# Patient Record
Sex: FEMALE | URBAN - METROPOLITAN AREA
[De-identification: names, ages, dates, MRNs, and addresses within clinical notes are randomized per-mention and may not be internally consistent; named-entity substitution may affect disease eponyms.]

---

## 2017-06-22 ENCOUNTER — APPOINTMENT (RX ONLY)
Dept: URBAN - METROPOLITAN AREA CLINIC 121 | Facility: CLINIC | Age: 77
Setting detail: DERMATOLOGY
End: 2017-06-22

## 2017-06-22 DIAGNOSIS — L21.8 OTHER SEBORRHEIC DERMATITIS: ICD-10-CM

## 2017-06-22 PROCEDURE — ? PRESCRIPTION

## 2017-06-22 PROCEDURE — 99213 OFFICE O/P EST LOW 20 MIN: CPT

## 2017-06-22 PROCEDURE — ? COUNSELING

## 2017-06-22 RX ORDER — DESONIDE 0.5 MG/G
CREAM TOPICAL BID
Qty: 1 | Refills: 1 | Status: ERX

## 2017-06-22 ASSESSMENT — LOCATION SIMPLE DESCRIPTION DERM: LOCATION SIMPLE: RIGHT EYEBROW

## 2017-06-22 ASSESSMENT — LOCATION DETAILED DESCRIPTION DERM: LOCATION DETAILED: RIGHT CENTRAL EYEBROW

## 2017-06-22 ASSESSMENT — LOCATION ZONE DERM: LOCATION ZONE: FACE

## 2017-06-22 NOTE — PROCEDURE: MIPS QUALITY
Quality 131: Pain Assessment And Follow-Up: Pain assessment using a standardized tool is documented as negative, no follow-up plan required
Quality 47: Advance Care Plan: Advance Care Planning discussed and documented in the medical record; patient did not wish or was not able to name a surrogate decision maker or provide an advance care plan.
Detail Level: Detailed
Quality 226: Preventive Care And Screening: Tobacco Use: Screening And Cessation Intervention: Patient screened for tobacco and never smoked
Quality 110: Preventive Care And Screening: Influenza Immunization: Influenza Immunization previously received during influenza season
Quality 130: Documentation Of Current Medications In The Medical Record: Current Medications Documented
Quality 111:Pneumonia Vaccination Status For Older Adults: Pneumococcal Vaccination Previously Received

## 2017-06-23 ENCOUNTER — RX ONLY (OUTPATIENT)
Age: 77
Setting detail: RX ONLY
End: 2017-06-23

## 2017-06-23 RX ORDER — FLUTICASONE PROPIONATE 0.5 MG/G
CREAM TOPICAL
Qty: 1 | Refills: 1 | Status: ERX

## 2017-07-27 ENCOUNTER — APPOINTMENT (RX ONLY)
Dept: URBAN - METROPOLITAN AREA CLINIC 116 | Facility: CLINIC | Age: 77
Setting detail: DERMATOLOGY
End: 2017-07-27

## 2017-07-27 DIAGNOSIS — Z02.9 ENCOUNTER FOR ADMINISTRATIVE EXAMINATIONS, UNSPECIFIED: ICD-10-CM

## 2018-05-24 ENCOUNTER — APPOINTMENT (RX ONLY)
Dept: URBAN - METROPOLITAN AREA CLINIC 121 | Facility: CLINIC | Age: 78
Setting detail: DERMATOLOGY
End: 2018-05-24

## 2018-05-24 DIAGNOSIS — Z02.9 ENCOUNTER FOR ADMINISTRATIVE EXAMINATIONS, UNSPECIFIED: ICD-10-CM

## 2018-05-31 ENCOUNTER — APPOINTMENT (RX ONLY)
Dept: URBAN - METROPOLITAN AREA CLINIC 121 | Facility: CLINIC | Age: 78
Setting detail: DERMATOLOGY
End: 2018-05-31

## 2018-05-31 DIAGNOSIS — Z02.9 ENCOUNTER FOR ADMINISTRATIVE EXAMINATIONS, UNSPECIFIED: ICD-10-CM

## 2018-07-05 ENCOUNTER — APPOINTMENT (RX ONLY)
Dept: URBAN - METROPOLITAN AREA CLINIC 121 | Facility: CLINIC | Age: 78
Setting detail: DERMATOLOGY
End: 2018-07-05

## 2018-07-05 DIAGNOSIS — L21.8 OTHER SEBORRHEIC DERMATITIS: ICD-10-CM

## 2018-07-05 DIAGNOSIS — Z85.828 PERSONAL HISTORY OF OTHER MALIGNANT NEOPLASM OF SKIN: ICD-10-CM

## 2018-07-05 DIAGNOSIS — L57.0 ACTINIC KERATOSIS: ICD-10-CM

## 2018-07-05 PROCEDURE — ? COUNSELING

## 2018-07-05 PROCEDURE — 17000 DESTRUCT PREMALG LESION: CPT

## 2018-07-05 PROCEDURE — ? LIQUID NITROGEN

## 2018-07-05 PROCEDURE — 99213 OFFICE O/P EST LOW 20 MIN: CPT | Mod: 25

## 2018-07-05 PROCEDURE — ? PRESCRIPTION

## 2018-07-05 PROCEDURE — 17003 DESTRUCT PREMALG LES 2-14: CPT

## 2018-07-05 RX ORDER — KETOCONAZOLE 20.5 MG/ML
SHAMPOO, SUSPENSION TOPICAL
Qty: 1 | Refills: 2 | Status: ERX

## 2018-07-05 ASSESSMENT — LOCATION DETAILED DESCRIPTION DERM
LOCATION DETAILED: INFERIOR MID FOREHEAD
LOCATION DETAILED: RIGHT SUPERIOR FOREHEAD
LOCATION DETAILED: LEFT INFERIOR FOREHEAD
LOCATION DETAILED: LEFT SUPERIOR FOREHEAD
LOCATION DETAILED: LEFT INFERIOR LATERAL FOREHEAD
LOCATION DETAILED: RIGHT INFERIOR FOREHEAD
LOCATION DETAILED: LEFT MEDIAL FRONTAL SCALP

## 2018-07-05 ASSESSMENT — LOCATION SIMPLE DESCRIPTION DERM
LOCATION SIMPLE: LEFT FOREHEAD
LOCATION SIMPLE: INFERIOR FOREHEAD
LOCATION SIMPLE: RIGHT FOREHEAD
LOCATION SIMPLE: LEFT SCALP

## 2018-07-05 ASSESSMENT — LOCATION ZONE DERM
LOCATION ZONE: FACE
LOCATION ZONE: SCALP

## 2018-08-09 ENCOUNTER — APPOINTMENT (RX ONLY)
Dept: URBAN - METROPOLITAN AREA CLINIC 116 | Facility: CLINIC | Age: 78
Setting detail: DERMATOLOGY
End: 2018-08-09

## 2018-08-09 DIAGNOSIS — Z02.9 ENCOUNTER FOR ADMINISTRATIVE EXAMINATIONS, UNSPECIFIED: ICD-10-CM

## 2018-09-11 ENCOUNTER — APPOINTMENT (RX ONLY)
Dept: URBAN - METROPOLITAN AREA CLINIC 121 | Facility: CLINIC | Age: 78
Setting detail: DERMATOLOGY
End: 2018-09-11

## 2018-09-11 DIAGNOSIS — Z02.9 ENCOUNTER FOR ADMINISTRATIVE EXAMINATIONS, UNSPECIFIED: ICD-10-CM

## 2018-09-27 ENCOUNTER — APPOINTMENT (RX ONLY)
Dept: URBAN - METROPOLITAN AREA CLINIC 121 | Facility: CLINIC | Age: 78
Setting detail: DERMATOLOGY
End: 2018-09-27

## 2018-09-27 DIAGNOSIS — L21.8 OTHER SEBORRHEIC DERMATITIS: ICD-10-CM

## 2018-09-27 PROCEDURE — 99213 OFFICE O/P EST LOW 20 MIN: CPT

## 2018-09-27 PROCEDURE — ? COUNSELING

## 2018-09-27 PROCEDURE — ? PRESCRIPTION

## 2018-09-27 RX ORDER — KETOCONAZOLE 20 MG/G
CREAM TOPICAL
Qty: 1 | Refills: 2 | Status: ERX

## 2018-09-27 ASSESSMENT — LOCATION DETAILED DESCRIPTION DERM
LOCATION DETAILED: RIGHT INFERIOR CENTRAL MALAR CHEEK
LOCATION DETAILED: RIGHT MEDIAL SUPERIOR CHEST

## 2018-09-27 ASSESSMENT — LOCATION SIMPLE DESCRIPTION DERM
LOCATION SIMPLE: CHEST
LOCATION SIMPLE: RIGHT CHEEK

## 2018-09-27 ASSESSMENT — LOCATION ZONE DERM
LOCATION ZONE: TRUNK
LOCATION ZONE: FACE

## 2018-10-09 ENCOUNTER — APPOINTMENT (RX ONLY)
Dept: URBAN - METROPOLITAN AREA CLINIC 116 | Facility: CLINIC | Age: 78
Setting detail: DERMATOLOGY
End: 2018-10-09

## 2018-10-09 DIAGNOSIS — Z02.9 ENCOUNTER FOR ADMINISTRATIVE EXAMINATIONS, UNSPECIFIED: ICD-10-CM

## 2018-11-08 ENCOUNTER — APPOINTMENT (RX ONLY)
Dept: URBAN - METROPOLITAN AREA CLINIC 121 | Facility: CLINIC | Age: 78
Setting detail: DERMATOLOGY
End: 2018-11-08

## 2018-11-08 DIAGNOSIS — L21.8 OTHER SEBORRHEIC DERMATITIS: ICD-10-CM

## 2018-11-08 PROCEDURE — ? COUNSELING

## 2018-11-08 PROCEDURE — ? PRESCRIPTION

## 2018-11-08 PROCEDURE — 99213 OFFICE O/P EST LOW 20 MIN: CPT

## 2018-11-08 RX ORDER — DESONIDE 0.5 MG/G
CREAM TOPICAL
Qty: 1 | Refills: 2 | Status: ERX

## 2018-11-08 RX ORDER — CLOBETASOL PROPIONATE 0.5 MG/ML
SOLUTION TOPICAL
Qty: 1 | Refills: 1 | Status: ERX

## 2018-11-08 ASSESSMENT — LOCATION SIMPLE DESCRIPTION DERM
LOCATION SIMPLE: NOSE
LOCATION SIMPLE: FRONTAL SCALP
LOCATION SIMPLE: RIGHT EAR

## 2018-11-08 ASSESSMENT — LOCATION DETAILED DESCRIPTION DERM
LOCATION DETAILED: MEDIAL FRONTAL SCALP
LOCATION DETAILED: NASAL SUPRATIP
LOCATION DETAILED: RIGHT CRUS OF HELIX

## 2018-11-08 ASSESSMENT — LOCATION ZONE DERM
LOCATION ZONE: NOSE
LOCATION ZONE: SCALP
LOCATION ZONE: EAR

## 2018-12-06 ENCOUNTER — RX ONLY (OUTPATIENT)
Age: 78
Setting detail: RX ONLY
End: 2018-12-06

## 2018-12-06 ENCOUNTER — APPOINTMENT (RX ONLY)
Dept: URBAN - METROPOLITAN AREA CLINIC 121 | Facility: CLINIC | Age: 78
Setting detail: DERMATOLOGY
End: 2018-12-06

## 2018-12-06 RX ORDER — DESONIDE 0.5 MG/G
CREAM TOPICAL
Qty: 1 | Refills: 2

## 2018-12-20 ENCOUNTER — APPOINTMENT (RX ONLY)
Dept: URBAN - METROPOLITAN AREA CLINIC 121 | Facility: CLINIC | Age: 78
Setting detail: DERMATOLOGY
End: 2018-12-20

## 2018-12-20 DIAGNOSIS — Z02.9 ENCOUNTER FOR ADMINISTRATIVE EXAMINATIONS, UNSPECIFIED: ICD-10-CM

## 2019-10-29 ENCOUNTER — APPOINTMENT (RX ONLY)
Dept: URBAN - METROPOLITAN AREA CLINIC 116 | Facility: CLINIC | Age: 79
Setting detail: DERMATOLOGY
End: 2019-10-29

## 2019-10-29 DIAGNOSIS — Z02.9 ENCOUNTER FOR ADMINISTRATIVE EXAMINATIONS, UNSPECIFIED: ICD-10-CM

## 2019-12-17 ENCOUNTER — APPOINTMENT (RX ONLY)
Dept: URBAN - METROPOLITAN AREA CLINIC 121 | Facility: CLINIC | Age: 79
Setting detail: DERMATOLOGY
End: 2019-12-17

## 2019-12-17 DIAGNOSIS — L57.0 ACTINIC KERATOSIS: ICD-10-CM

## 2019-12-17 PROCEDURE — 17000 DESTRUCT PREMALG LESION: CPT

## 2019-12-17 PROCEDURE — 17003 DESTRUCT PREMALG LES 2-14: CPT

## 2019-12-17 PROCEDURE — ? LIQUID NITROGEN

## 2019-12-17 ASSESSMENT — LOCATION DETAILED DESCRIPTION DERM
LOCATION DETAILED: LEFT MEDIAL MALAR CHEEK
LOCATION DETAILED: RIGHT MEDIAL MALAR CHEEK
LOCATION DETAILED: NASAL DORSUM

## 2019-12-17 ASSESSMENT — LOCATION ZONE DERM
LOCATION ZONE: FACE
LOCATION ZONE: NOSE

## 2019-12-17 ASSESSMENT — LOCATION SIMPLE DESCRIPTION DERM
LOCATION SIMPLE: LEFT CHEEK
LOCATION SIMPLE: NOSE
LOCATION SIMPLE: RIGHT CHEEK

## 2019-12-17 NOTE — PROCEDURE: MIPS QUALITY
Quality 131: Pain Assessment And Follow-Up: Pain assessment using a standardized tool is documented as negative, no follow-up plan required
Quality 130: Documentation Of Current Medications In The Medical Record: Current Medications Documented
Detail Level: Detailed
Additional Notes: Patient states pain as negative on a scale of 0-10. Pain level 0.

## 2019-12-17 NOTE — PROCEDURE: LIQUID NITROGEN
Render Note In Bullet Format When Appropriate: No
Detail Level: Simple
Consent: The patient's consent was obtained including but not limited to risks of crusting, scabbing, blistering, scarring, darker or lighter pigmentary change, recurrence, incomplete removal and infection.
Render Post-Care Instructions In Note?: yes
Number Of Freeze-Thaw Cycles: 2 freeze-thaw cycles
Duration Of Freeze Thaw-Cycle (Seconds): 10
Post-Care Instructions: I reviewed with the patient in detail post-care instructions. Patient is to wear sunprotection, and avoid picking at any of the treated lesions. Pt may apply Vaseline to crusted or scabbing areas.

## 2020-01-14 ENCOUNTER — APPOINTMENT (RX ONLY)
Dept: URBAN - METROPOLITAN AREA CLINIC 116 | Facility: CLINIC | Age: 80
Setting detail: DERMATOLOGY
End: 2020-01-14

## 2020-01-14 DIAGNOSIS — Z02.9 ENCOUNTER FOR ADMINISTRATIVE EXAMINATIONS, UNSPECIFIED: ICD-10-CM

## 2020-06-10 ENCOUNTER — OFFICE VISIT (OUTPATIENT)
Dept: URBAN - METROPOLITAN AREA TELEHEALTH 2 | Facility: TELEHEALTH | Age: 80
End: 2020-06-10

## 2020-06-10 ENCOUNTER — OFFICE VISIT (OUTPATIENT)
Dept: URBAN - METROPOLITAN AREA CLINIC 60 | Facility: CLINIC | Age: 80
End: 2020-06-10

## 2020-06-10 ENCOUNTER — OFFICE VISIT (OUTPATIENT)
Dept: URBAN - METROPOLITAN AREA CLINIC 63 | Facility: CLINIC | Age: 80
End: 2020-06-10

## 2020-08-12 ENCOUNTER — OFFICE VISIT (OUTPATIENT)
Dept: URBAN - METROPOLITAN AREA CLINIC 63 | Facility: CLINIC | Age: 80
End: 2020-08-12

## 2020-09-28 ENCOUNTER — OFFICE VISIT (OUTPATIENT)
Dept: URBAN - METROPOLITAN AREA CLINIC 63 | Facility: CLINIC | Age: 80
End: 2020-09-28

## 2020-11-11 ENCOUNTER — OFFICE VISIT (OUTPATIENT)
Dept: URBAN - METROPOLITAN AREA CLINIC 63 | Facility: CLINIC | Age: 80
End: 2020-11-11

## 2021-01-06 ENCOUNTER — OFFICE VISIT (OUTPATIENT)
Dept: URBAN - METROPOLITAN AREA CLINIC 63 | Facility: CLINIC | Age: 81
End: 2021-01-06

## 2021-02-17 ENCOUNTER — OFFICE VISIT (OUTPATIENT)
Dept: URBAN - METROPOLITAN AREA CLINIC 63 | Facility: CLINIC | Age: 81
End: 2021-02-17

## 2021-03-08 ENCOUNTER — OFFICE VISIT (OUTPATIENT)
Dept: URBAN - METROPOLITAN AREA CLINIC 121 | Facility: CLINIC | Age: 81
End: 2021-03-08

## 2021-09-22 ENCOUNTER — OFFICE VISIT (OUTPATIENT)
Dept: URBAN - METROPOLITAN AREA CLINIC 63 | Facility: CLINIC | Age: 81
End: 2021-09-22

## 2021-11-08 ENCOUNTER — OFFICE VISIT (OUTPATIENT)
Dept: URBAN - METROPOLITAN AREA CLINIC 63 | Facility: CLINIC | Age: 81
End: 2021-11-08

## 2021-11-29 ENCOUNTER — OFFICE VISIT (OUTPATIENT)
Dept: URBAN - METROPOLITAN AREA CLINIC 63 | Facility: CLINIC | Age: 81
End: 2021-11-29

## 2022-01-19 ENCOUNTER — OFFICE VISIT (OUTPATIENT)
Dept: URBAN - METROPOLITAN AREA CLINIC 63 | Facility: CLINIC | Age: 82
End: 2022-01-19

## 2022-01-27 ENCOUNTER — OFFICE VISIT (OUTPATIENT)
Dept: URBAN - METROPOLITAN AREA CLINIC 60 | Facility: CLINIC | Age: 82
End: 2022-01-27

## 2022-07-09 ENCOUNTER — TELEPHONE ENCOUNTER (OUTPATIENT)
Dept: URBAN - METROPOLITAN AREA CLINIC 121 | Facility: CLINIC | Age: 82
End: 2022-07-09

## 2022-07-09 RX ORDER — FLUTICASONE PROPIONATE AND SALMETEROL XINAFOATE 230; 21 UG/1; UG/1
AEROSOL, METERED RESPIRATORY (INHALATION)
Refills: 0 | OUTPATIENT
Start: 2014-08-22 | End: 2018-08-08

## 2022-07-09 RX ORDER — ASPIRIN 81 MG/1
TABLET, FILM COATED ORAL ONCE A DAY
Refills: 0 | OUTPATIENT
Start: 2021-09-22 | End: 2022-01-27

## 2022-07-09 RX ORDER — POTASSIUM CHLORIDE 750 MG/1
TABLET, FILM COATED, EXTENDED RELEASE ORAL ONCE A DAY
Refills: 0 | OUTPATIENT
Start: 2014-08-22 | End: 2018-08-08

## 2022-07-09 RX ORDER — ALPRAZOLAM 0.5 MG/1
TABLET ORAL TWICE A DAY
Refills: 0 | OUTPATIENT
Start: 2020-06-10 | End: 2021-09-22

## 2022-07-09 RX ORDER — FAMOTIDINE 40 MG/1
TAKE 1 TABLET BY MOUTH ONCE A DAY TABLET, FILM COATED ORAL
Refills: 0 | OUTPATIENT
Start: 2020-10-15 | End: 2020-11-13

## 2022-07-09 RX ORDER — FAMOTIDINE 40 MG/1
TAKE 1 TABLET BY MOUTH ONCE A DAY TABLET, FILM COATED ORAL
Refills: 0 | OUTPATIENT
Start: 2020-07-27 | End: 2020-10-15

## 2022-07-09 RX ORDER — ARIPIPRAZOLE 5 MG/1
TABLET ORAL ONCE A DAY
Refills: 0 | OUTPATIENT
Start: 2014-08-22 | End: 2018-08-08

## 2022-07-09 RX ORDER — FAMOTIDINE 40 MG/1
TAKE 1 TABLET BY MOUTH ONCE A DAY TABLET, FILM COATED ORAL
Refills: 0 | OUTPATIENT
Start: 2021-03-19 | End: 2021-04-08

## 2022-07-09 RX ORDER — POTASSIUM CHLORIDE 750 MG/1
TABLET, FILM COATED, EXTENDED RELEASE ORAL ONCE A DAY
Refills: 0 | OUTPATIENT
Start: 2018-08-08 | End: 2020-06-10

## 2022-07-09 RX ORDER — FAMOTIDINE 40 MG/1
TAKE 1 TABLET BY MOUTH ONCE A DAY TABLET, FILM COATED ORAL
Refills: 0 | OUTPATIENT
Start: 2021-02-16 | End: 2021-03-19

## 2022-07-09 RX ORDER — GLYBURIDE 2.5 MG/1
TABLET ORAL ONCE A DAY
Refills: 0 | OUTPATIENT
Start: 2018-08-08 | End: 2020-06-10

## 2022-07-09 RX ORDER — GLYBURIDE 2.5 MG/1
TABLET ORAL ONCE A DAY
Refills: 0 | OUTPATIENT
Start: 2014-08-22 | End: 2018-08-08

## 2022-07-09 RX ORDER — METFORMIN HYDROCHLORIDE 750 MG/1
TABLET, EXTENDED RELEASE ORAL
Refills: 0 | OUTPATIENT
Start: 2020-05-06 | End: 2021-09-22

## 2022-07-09 RX ORDER — BUPROPION HYDROCHLORIDE 150 MG/1
TABLET, EXTENDED RELEASE ORAL ONCE A DAY
Refills: 0 | OUTPATIENT
Start: 2021-09-22 | End: 2022-01-27

## 2022-07-09 RX ORDER — BUPROPION HYDROCHLORIDE 150 MG/1
TABLET, EXTENDED RELEASE ORAL ONCE A DAY
Refills: 0 | OUTPATIENT
Start: 2018-08-08 | End: 2020-06-10

## 2022-07-09 RX ORDER — SERTRALINE HCL 100 MG
TABLET ORAL ONCE A DAY
Refills: 0 | OUTPATIENT
Start: 2020-06-10 | End: 2021-09-22

## 2022-07-09 RX ORDER — FLUTICASONE PROPIONATE 50 UG/1
SPRAY, METERED NASAL
Refills: 0 | OUTPATIENT
Start: 2014-08-22 | End: 2018-08-08

## 2022-07-09 RX ORDER — FAMOTIDINE 40 MG/1
ONCE A DAY TABLET, FILM COATED ORAL ONCE A DAY
Refills: 1 | OUTPATIENT
Start: 2020-06-10 | End: 2020-07-27

## 2022-07-09 RX ORDER — MONTELUKAST SODIUM 10 MG/1
TABLET, FILM COATED ORAL ONCE A DAY
Refills: 0 | OUTPATIENT
Start: 2020-06-10 | End: 2021-09-22

## 2022-07-09 RX ORDER — FAMOTIDINE 40 MG/1
TAKE 1 TABLET BY MOUTH ONCE A DAY TABLET, FILM COATED ORAL
Refills: 0 | OUTPATIENT
Start: 2021-01-14 | End: 2021-02-16

## 2022-07-09 RX ORDER — METOPROLOL TARTRATE 25 MG/1
TABLET, FILM COATED ORAL ONCE A DAY
Refills: 0 | OUTPATIENT
Start: 2021-07-05 | End: 2022-01-27

## 2022-07-09 RX ORDER — BUPROPION HYDROCHLORIDE 150 MG/1
TABLET, EXTENDED RELEASE ORAL ONCE A DAY
Refills: 0 | OUTPATIENT
Start: 2014-08-22 | End: 2018-08-08

## 2022-07-09 RX ORDER — SERTRALINE HCL 100 MG
TABLET ORAL ONCE A DAY
Refills: 0 | OUTPATIENT
Start: 2021-09-22 | End: 2021-09-22

## 2022-07-09 RX ORDER — FUROSEMIDE 40 MG/1
TABLET ORAL ONCE A DAY
Refills: 0 | OUTPATIENT
Start: 2018-08-08 | End: 2020-06-10

## 2022-07-09 RX ORDER — FLUTICASONE PROPIONATE 50 UG/1
SPRAY, METERED NASAL
Refills: 0 | OUTPATIENT
Start: 2018-08-08 | End: 2020-06-10

## 2022-07-09 RX ORDER — TIOTROPIUM BROMIDE 18 UG/1
CAPSULE ORAL; RESPIRATORY (INHALATION) ONCE A DAY
Refills: 0 | OUTPATIENT
Start: 2014-08-22 | End: 2018-08-08

## 2022-07-09 RX ORDER — ALPRAZOLAM 0.5 MG/1
TABLET ORAL TWICE A DAY
Refills: 0 | OUTPATIENT
Start: 2014-08-22 | End: 2018-08-08

## 2022-07-09 RX ORDER — BUPROPION HYDROCHLORIDE 150 MG/1
TABLET, EXTENDED RELEASE ORAL ONCE A DAY
Refills: 0 | OUTPATIENT
Start: 2020-06-10 | End: 2021-09-22

## 2022-07-09 RX ORDER — UMECLIDINIUM BROMIDE AND VILANTEROL TRIFENATATE 62.5; 25 UG/1; UG/1
POWDER RESPIRATORY (INHALATION)
Refills: 0 | OUTPATIENT
Start: 2018-07-22 | End: 2020-06-10

## 2022-07-09 RX ORDER — LISINOPRIL 10 MG/1
TABLET ORAL ONCE A DAY
Refills: 0 | OUTPATIENT
Start: 2014-08-22 | End: 2018-08-08

## 2022-07-09 RX ORDER — POTASSIUM CHLORIDE 750 MG/1
TABLET, FILM COATED, EXTENDED RELEASE ORAL ONCE A DAY
Refills: 0 | OUTPATIENT
Start: 2020-06-10 | End: 2021-09-22

## 2022-07-09 RX ORDER — FAMOTIDINE 20 MG/1
ONCE A DAY TABLET ORAL ONCE A DAY
Refills: 1 | OUTPATIENT
Start: 2021-09-22 | End: 2021-09-28

## 2022-07-09 RX ORDER — ESTRADIOL 0.1 MG/G
TWICE AWEEK CREAM VAGINAL
Refills: 0 | OUTPATIENT
Start: 2018-07-23 | End: 2020-06-10

## 2022-07-09 RX ORDER — SERTRALINE HCL 100 MG
TABLET ORAL ONCE A DAY
Refills: 0 | OUTPATIENT
Start: 2018-08-08 | End: 2020-06-10

## 2022-07-09 RX ORDER — FAMOTIDINE 40 MG/1
TAKE 1 TABLET BY MOUTH ONCE A DAY TABLET, FILM COATED ORAL
Refills: 0 | OUTPATIENT
Start: 2021-04-08 | End: 2021-05-05

## 2022-07-09 RX ORDER — FUROSEMIDE 40 MG/1
TABLET ORAL ONCE A DAY
Refills: 0 | OUTPATIENT
Start: 2020-06-10 | End: 2021-09-22

## 2022-07-09 RX ORDER — ESOMEPRAZOLE MAGNESIUM 40 MG
CAPSULE,DELAYED RELEASE (ENTERIC COATED) ORAL ONCE A DAY
Refills: 0 | OUTPATIENT
Start: 2014-08-22 | End: 2018-08-08

## 2022-07-09 RX ORDER — MONTELUKAST SODIUM 10 MG/1
TABLET, FILM COATED ORAL ONCE A DAY
Refills: 0 | OUTPATIENT
Start: 2018-08-08 | End: 2020-06-10

## 2022-07-09 RX ORDER — LISINOPRIL 10 MG/1
TABLET ORAL ONCE A DAY
Refills: 0 | OUTPATIENT
Start: 2020-06-10 | End: 2021-09-22

## 2022-07-09 RX ORDER — SERTRALINE HCL 100 MG
TABLET ORAL ONCE A DAY
Refills: 0 | OUTPATIENT
Start: 2014-08-22 | End: 2018-08-08

## 2022-07-09 RX ORDER — FAMOTIDINE 40 MG/1
TAKE 1 TABLET BY MOUTH ONCE A DAY TABLET, FILM COATED ORAL
Refills: 0 | OUTPATIENT
Start: 2020-12-15 | End: 2021-01-14

## 2022-07-09 RX ORDER — FUROSEMIDE 40 MG/1
TABLET ORAL ONCE A DAY
Refills: 0 | OUTPATIENT
Start: 2014-08-22 | End: 2018-08-08

## 2022-07-09 RX ORDER — NITROFURANTOIN MACROCRYSTAL 50 MG
CAPSULE ORAL ONCE A DAY
Refills: 0 | OUTPATIENT
Start: 2014-08-22 | End: 2018-08-08

## 2022-07-09 RX ORDER — MONTELUKAST SODIUM 10 MG/1
TABLET, FILM COATED ORAL ONCE A DAY
Refills: 0 | OUTPATIENT
Start: 2014-08-22 | End: 2018-08-08

## 2022-07-09 RX ORDER — ALPRAZOLAM 0.5 MG/1
TABLET ORAL TWICE A DAY
Refills: 0 | OUTPATIENT
Start: 2018-08-08 | End: 2020-06-10

## 2022-07-09 RX ORDER — FAMOTIDINE 40 MG/1
ONCE A DAY TABLET, FILM COATED ORAL ONCE A DAY
Refills: 0 | OUTPATIENT
Start: 2021-05-05 | End: 2021-07-09

## 2022-07-09 RX ORDER — FUROSEMIDE 40 MG/1
TABLET ORAL ONCE A DAY
Refills: 0 | OUTPATIENT
Start: 2021-09-22 | End: 2022-01-27

## 2022-07-09 RX ORDER — FAMOTIDINE 40 MG/1
TAKE 1 TABLET BY MOUTH ONCE A DAY TABLET, FILM COATED ORAL
Refills: 0 | OUTPATIENT
Start: 2020-11-13 | End: 2020-12-15

## 2022-07-09 RX ORDER — DIAZEPAM 5 MG/1
TABLET ORAL TAKE AS DIRECTED
Refills: 0 | OUTPATIENT
Start: 2014-10-04 | End: 2018-08-08

## 2022-07-09 RX ORDER — LISINOPRIL 10 MG/1
TABLET ORAL ONCE A DAY
Refills: 0 | OUTPATIENT
Start: 2018-08-08 | End: 2020-06-10

## 2022-07-10 ENCOUNTER — TELEPHONE ENCOUNTER (OUTPATIENT)
Dept: URBAN - METROPOLITAN AREA CLINIC 121 | Facility: CLINIC | Age: 82
End: 2022-07-10

## 2022-07-10 RX ORDER — SUCRALFATE 1 G/1
TABLET ORAL THREE TIMES A DAY
Refills: 3 | Status: ACTIVE | COMMUNITY
Start: 2011-03-24

## 2022-07-10 RX ORDER — MONTELUKAST SODIUM 10 MG/1
TABLET, FILM COATED ORAL ONCE A DAY
Refills: 0 | Status: ACTIVE | COMMUNITY
Start: 2021-09-22

## 2022-07-10 RX ORDER — FLUCONAZOLE 100 MG/1
2 PO DAY ONE THEN ONE PO DAILY FOR ANOTHER 13 DAYS TABLET ORAL TAKE AS DIRECTED
Refills: 0 | Status: ACTIVE | COMMUNITY
Start: 2014-09-23

## 2022-07-10 RX ORDER — LISINOPRIL 5 MG/1
TABLET ORAL
Refills: 0 | Status: ACTIVE | COMMUNITY
Start: 2022-01-13

## 2022-07-10 RX ORDER — UMECLIDINIUM BROMIDE AND VILANTEROL TRIFENATATE 62.5; 25 UG/1; UG/1
POWDER RESPIRATORY (INHALATION) TAKE AS DIRECTED
Refills: 0 | Status: ACTIVE | COMMUNITY
Start: 2021-09-15

## 2022-07-10 RX ORDER — FAMOTIDINE 20 MG/1
ONCE A DAY TABLET ORAL ONCE A DAY
Refills: 1 | Status: ACTIVE | COMMUNITY
Start: 2021-09-28

## 2022-07-10 RX ORDER — AMOXICILLIN AND CLAVULANATE POTASSIUM 875; 125 MG/1; MG/1
TABLET ORAL TWICE A DAY
Refills: 0 | Status: ACTIVE | COMMUNITY
Start: 2022-01-18

## 2022-07-10 RX ORDER — ALPRAZOLAM 0.5 MG/1
TABLET ORAL TWICE A DAY
Refills: 0 | Status: ACTIVE | COMMUNITY
Start: 2021-09-22

## 2022-07-10 RX ORDER — POTASSIUM CHLORIDE 750 MG/1
TABLET, FILM COATED, EXTENDED RELEASE ORAL ONCE A DAY
Refills: 0 | Status: ACTIVE | COMMUNITY
Start: 2021-09-22

## 2022-07-10 RX ORDER — ESTRADIOL 0.1 MG/G
TWICE AWEEK CREAM VAGINAL
Refills: 0 | Status: ACTIVE | COMMUNITY
Start: 2020-06-10

## 2022-07-10 RX ORDER — SERTRALINE 100 MG/1
TABLET, FILM COATED ORAL ONCE A DAY
Refills: 0 | Status: ACTIVE | COMMUNITY
Start: 2021-06-21

## 2022-07-10 RX ORDER — FLUTICASONE PROPIONATE 50 UG/1
SPRAY, METERED NASAL
Refills: 0 | Status: ACTIVE | COMMUNITY
Start: 2020-06-10

## 2022-07-10 RX ORDER — TORSEMIDE 20 MG/1
TABLET ORAL ONCE A DAY
Refills: 0 | Status: ACTIVE | COMMUNITY
Start: 2021-09-02

## 2022-07-10 RX ORDER — FAMOTIDINE 40 MG/1
TAKE 1 TABLET BY MOUTH ONCE DAILY TABLET, FILM COATED ORAL
Refills: 0 | Status: ACTIVE | COMMUNITY
Start: 2021-07-09

## 2022-07-10 RX ORDER — LISINOPRIL 10 MG/1
TABLET ORAL ONCE A DAY
Refills: 0 | Status: ACTIVE | COMMUNITY
Start: 2021-09-22

## 2022-07-10 RX ORDER — FLUCONAZOLE 100 MG/1
TAKE 2 PO ON DAY ONE THEN ONE PO DAILY TABLET ORAL ONCE A DAY
Refills: 0 | Status: ACTIVE | COMMUNITY
Start: 2011-03-07

## 2022-07-10 RX ORDER — METFORMIN HYDROCHLORIDE 750 MG/1
TABLET, EXTENDED RELEASE ORAL
Refills: 0 | Status: ACTIVE | COMMUNITY
Start: 2021-09-22

## 2022-07-10 RX ORDER — UMECLIDINIUM BROMIDE AND VILANTEROL TRIFENATATE 62.5; 25 UG/1; UG/1
POWDER RESPIRATORY (INHALATION)
Refills: 0 | Status: ACTIVE | COMMUNITY
Start: 2020-06-10

## 2022-07-10 RX ORDER — FAMOTIDINE 20 MG/1
TABLET ORAL
Refills: 0 | Status: ACTIVE | COMMUNITY
Start: 2022-01-13

## 2022-07-30 ENCOUNTER — TELEPHONE ENCOUNTER (OUTPATIENT)
Age: 82
End: 2022-07-30

## 2022-07-31 ENCOUNTER — TELEPHONE ENCOUNTER (OUTPATIENT)
Age: 82
End: 2022-07-31

## 2022-07-31 RX ORDER — ESOMEPRAZOLE MAGNESIUM 40 MG
1 (ONE) CAPSULE DR CAPSULE,DELAYED RELEASE (ENTERIC COATED) ORAL DAILY
Qty: 0 | Refills: 16 | Status: ACTIVE | COMMUNITY
Start: 2015-08-14

## 2022-07-31 RX ORDER — ESOMEPRAZOLE MAGNESIUM 40 MG
1 (ONE) CAPSULE DR CAPSULE,DELAYED RELEASE (ENTERIC COATED) ORAL DAILY
Qty: 0 | Refills: 16 | Status: ACTIVE | COMMUNITY
Start: 2015-08-17